# Patient Record
Sex: MALE | Race: BLACK OR AFRICAN AMERICAN | NOT HISPANIC OR LATINO | Employment: FULL TIME | URBAN - NONMETROPOLITAN AREA
[De-identification: names, ages, dates, MRNs, and addresses within clinical notes are randomized per-mention and may not be internally consistent; named-entity substitution may affect disease eponyms.]

---

## 2023-02-15 ENCOUNTER — TELEPHONE (OUTPATIENT)
Dept: FAMILY MEDICINE | Facility: OTHER | Age: 38
End: 2023-02-15

## 2023-02-15 NOTE — TELEPHONE ENCOUNTER
Offered patient appointment with Dr Galdamez 2/16/23 and Tatiana Richmond 2/17/23. Patient declined. Patient stated will wait for appointment with Marisa Hathaway 3/6/23. Provided patient with phone number if decided to schedule

## 2023-02-15 NOTE — TELEPHONE ENCOUNTER
I have no openings today.  Do any [facility] providers have anything open tomorrow?  I am out tomorrow, but perhaps another provider here or [place] has something?    I will establish care with him in March as scheduled    Marisa DANIELS  [phone]

## 2023-02-15 NOTE — TELEPHONE ENCOUNTER
Patient is scheduled to re-establish with Fljuliano on 3/6/23 since he hasn't been seen for 5 years.      Mimi [phone] spouse    Do you want to see him today or at a later date?        ER F/U 12/14/23  Kidney stones    [facility], no chart available at this time    Patient reported he was given  Promethazine 25 mg, Tamulosin    Hydrocodone acetaminophen,  Has not picked it up yet        Patient has urinated 2 times since last night and he was able to retrieve a small red piece, he doesn't know if it is a clot or a stone.

## 2023-02-23 NOTE — PROGRESS NOTES
Assessment & Plan          Encounter to establish care  - Chart updated      Calculus of kidney  - Stone analysis; Future  - Renal panel (Alb, BUN, Ca, Cl, CO2, Creat, Gluc, Phos, K, Na); Future      Immunization due  - TDAP VACCINE (Adacel, Boostrix)  [[id number]]      Family history of diabetes mellitus  - Glucose; Future        Follow-up this Spring please for further lab        Marisa Hathaway, CNP  [facility] - TIFFANY Springer is a 37 year old, presenting for the following health issues:  Establish Care          Concern - Renal calculi - left  He has since passed the stone and brought it today   Onset: 23  Description: Left flank pain  Intensity: severe  Progression of Symptoms:  improving  Accompanying Signs & Symptoms: none at this time  Previous history of similar problem: no  Precipitating factors:        Worsened by: nothing  Alleviating factors:        Improved by: passing stone  Therapies tried and outcome:  none           No further nausea or vomiting  No flank pain  No hematuria          Scott Rivera DO - 2023 11:57 PM CST  Formatting of this note is different from the original.  Patient:   Ti Velázquez    Means of Arrival:   Car    Chief Complaint:  Abdominal Pain    History of Present Illness:  Patient Name: Gian Velázquez   : 1985 Age: 37 year old Sex: male   Date of Service: 2023  MRN: [id number]   Provider: Scott Rivera DO, Emergency Medicine    EMERGENCY ROOM REPORT    SITE: [facility] Emergency Department     Patient seen at approximately 11:50 PM.    CHIEF COMPLAINT: Abdominal pain    HISTORY OF PRESENT ILLNESS: Patient is a 37-year-old male who presents ER with his significant other. Patient claims him and his significant other both ate Mexican food tonight round 630. Approximate hour and a half later he had sudden onset left lower quadrant abdominal pain is constant but waxes and wanes. At times goes straight through to his  back. He feels bloated and has had several bouts of diarrhea tonight he believes 3 total 1 bout of vomiting. Denies any blunt trauma to his abdomen. His significant other is not sick. He has taken no medication for this prior to arrival. Is never had surgery on his abdomen before. Patient denies any chills or fevers. Rates pain as moderate in nature.         Emergency Department Course:  ED Course as of 02/15/23 0104   Wed Feb 15, 2023   0058 Urinalysis, Reflex to Microscopic [RZ]   0104 Urinalysis, Reflex to Microscopic [RZ]     ED Course User Index  [RZ] Scott Rivera, DO     Medications   sodium chloride 0.9% (NS) infusion 1,000 mL (has no administration in time range)   famotidine (PF) (Pepcid) injection 20 mg (has no administration in time range)   ondansetron (Zofran) injection 4 mg (has no administration in time range)   HYDROmorphone PF (Dilaudid) injection 0.5 mg (has no administration in time range)            CT ABDOMEN PELVIS W IV CONTRAST    HISTORY: LLQ abdominal pain; Region of Interest and Additional Information->sudden osnet LLQ pain with vomiting x 3 hours, IV contrast only    FINDINGS:  There is no acute pneumonitis in the imaged lung bases. The heart size is normal. No pericardial or pleural effusion.    The liver demonstrate mild diffuse low attenuation compatible with steatosis. The gallbladder is unremarkable. No biliary dilation. The pancreas, spleen, and adrenal glands are unremarkable. The left kidney demonstrates a mildly delayed nephrogram and slight hydronephrosis. There is a 3 mm stone in the distal left ureter just proximal to the ureterovesicular junction. No additional urinary tract calculi demonstrated.    The bowel is normal in caliber. No obstruction. No wall thickening or adjacent inflammatory edema. No ascites or fluid collection. No pathologically enlarged lymph nodes.    IMPRESSION:  1. 3 mm stone in the distal left ureter resulting in minimal  hydronephrosis.    Electronically Signed: Ajay Burns MD 2/15/2023 12:42 AM          PHQ 2/24/2023   PHQ-9 Total Score 0   Q9: Thoughts of better off dead/self-harm past 2 weeks Not at all         TATI-7 SCORE 2/24/2023   Total Score 0           Patient Active Problem List   Diagnosis     Vitamin D deficiency     Seasonal allergic rhinitis     No past surgical history on file.    Social History     Tobacco Use     Smoking status: Every Day     Smokeless tobacco: Not on file   Substance Use Topics     Alcohol use: Not on file     No family history on file.          No current outpatient medications on file.         Allergies   Allergen Reactions     Seasonal Allergies          No lab results found.         BP Readings from Last 3 Encounters:   02/24/23 112/80   10/05/15 108/80   12/25/14 112/75    Wt Readings from Last 3 Encounters:   02/24/23 91.3 kg (201 lb 4.8 oz)   10/05/15 81.6 kg (180 lb)                Review of Systems   Constitutional, HEENT, cardiovascular, pulmonary, GI, , musculoskeletal, neuro, skin, endocrine and psych systems are negative, except as otherwise noted.            Objective    /80 (BP Location: Left arm, Patient Position: Sitting, Cuff Size: Adult Large)   Pulse 73   Temp 97.8  F (36.6  C) (Tympanic)   Resp 18   Wt 91.3 kg (201 lb 4.8 oz)   SpO2 98%   There is no height or weight on file to calculate BMI.           Physical Exam   GENERAL: healthy, alert and no distress  EYES: Eyes grossly normal to inspection, PERRL and conjunctivae and sclerae normal  NECK: no adenopathy, no asymmetry, masses, or scars and thyroid normal to palpation  RESP: lungs clear to auscultation - no rales, rhonchi or wheezes  CV: regular rate and rhythm, normal S1 S2, no S3 or S4, no murmur, click or rub, no peripheral edema and peripheral pulses strong  ABDOMEN: soft, nontender, no hepatosplenomegaly, no masses and bowel sounds normal  MS: no gross musculoskeletal defects noted, no edema  SKIN:  no suspicious lesions or rashes  PSYCH: mentation appears normal, affect normal/bright

## 2023-02-24 ENCOUNTER — OFFICE VISIT (OUTPATIENT)
Dept: FAMILY MEDICINE | Facility: OTHER | Age: 38
End: 2023-02-24
Attending: NURSE PRACTITIONER
Payer: COMMERCIAL

## 2023-02-24 VITALS
DIASTOLIC BLOOD PRESSURE: 80 MMHG | TEMPERATURE: 97.8 F | HEART RATE: 73 BPM | WEIGHT: 201.3 LBS | RESPIRATION RATE: 18 BRPM | OXYGEN SATURATION: 98 % | SYSTOLIC BLOOD PRESSURE: 112 MMHG

## 2023-02-24 DIAGNOSIS — Z76.89 ENCOUNTER TO ESTABLISH CARE: ICD-10-CM

## 2023-02-24 DIAGNOSIS — Z23 IMMUNIZATION DUE: Primary | ICD-10-CM

## 2023-02-24 DIAGNOSIS — Z83.3 FAMILY HISTORY OF DIABETES MELLITUS: ICD-10-CM

## 2023-02-24 DIAGNOSIS — N20.0 CALCULUS OF KIDNEY: ICD-10-CM

## 2023-02-24 LAB
ALBUMIN SERPL BCG-MCNC: 4.7 G/DL (ref 3.5–5.2)
ANION GAP SERPL CALCULATED.3IONS-SCNC: 13 MMOL/L (ref 7–15)
BUN SERPL-MCNC: 22.5 MG/DL (ref 6–20)
CALCIUM SERPL-MCNC: 10.1 MG/DL (ref 8.6–10)
CHLORIDE SERPL-SCNC: 105 MMOL/L (ref 98–107)
CREAT SERPL-MCNC: 1.2 MG/DL (ref 0.67–1.17)
DEPRECATED HCO3 PLAS-SCNC: 22 MMOL/L (ref 22–29)
FASTING STATUS PATIENT QL REPORTED: YES
GFR SERPL CREATININE-BSD FRML MDRD: 79 ML/MIN/1.73M2
GLUCOSE SERPL-MCNC: 103 MG/DL (ref 70–99)
GLUCOSE SERPL-MCNC: 103 MG/DL (ref 70–99)
HOLD SPECIMEN: NORMAL
PHOSPHATE SERPL-MCNC: 3.2 MG/DL (ref 2.5–4.5)
POTASSIUM SERPL-SCNC: 4.5 MMOL/L (ref 3.4–5.3)
SODIUM SERPL-SCNC: 140 MMOL/L (ref 136–145)

## 2023-02-24 PROCEDURE — 99203 OFFICE O/P NEW LOW 30 MIN: CPT | Mod: 25 | Performed by: NURSE PRACTITIONER

## 2023-02-24 PROCEDURE — 82365 CALCULUS SPECTROSCOPY: CPT | Mod: 90 | Performed by: NURSE PRACTITIONER

## 2023-02-24 PROCEDURE — 90715 TDAP VACCINE 7 YRS/> IM: CPT | Performed by: NURSE PRACTITIONER

## 2023-02-24 PROCEDURE — 90471 IMMUNIZATION ADMIN: CPT | Performed by: NURSE PRACTITIONER

## 2023-02-24 PROCEDURE — 36415 COLL VENOUS BLD VENIPUNCTURE: CPT | Performed by: NURSE PRACTITIONER

## 2023-02-24 PROCEDURE — 80069 RENAL FUNCTION PANEL: CPT | Performed by: NURSE PRACTITIONER

## 2023-02-24 ASSESSMENT — ANXIETY QUESTIONNAIRES
2. NOT BEING ABLE TO STOP OR CONTROL WORRYING: NOT AT ALL
GAD7 TOTAL SCORE: 0
IF YOU CHECKED OFF ANY PROBLEMS ON THIS QUESTIONNAIRE, HOW DIFFICULT HAVE THESE PROBLEMS MADE IT FOR YOU TO DO YOUR WORK, TAKE CARE OF THINGS AT HOME, OR GET ALONG WITH OTHER PEOPLE: NOT DIFFICULT AT ALL
5. BEING SO RESTLESS THAT IT IS HARD TO SIT STILL: NOT AT ALL
6. BECOMING EASILY ANNOYED OR IRRITABLE: NOT AT ALL
GAD7 TOTAL SCORE: 0
7. FEELING AFRAID AS IF SOMETHING AWFUL MIGHT HAPPEN: NOT AT ALL
1. FEELING NERVOUS, ANXIOUS, OR ON EDGE: NOT AT ALL
3. WORRYING TOO MUCH ABOUT DIFFERENT THINGS: NOT AT ALL

## 2023-02-24 ASSESSMENT — PATIENT HEALTH QUESTIONNAIRE - PHQ9
5. POOR APPETITE OR OVEREATING: NOT AT ALL
SUM OF ALL RESPONSES TO PHQ QUESTIONS 1-9: 0

## 2023-02-24 ASSESSMENT — PAIN SCALES - GENERAL: PAINLEVEL: NO PAIN (0)

## 2023-02-24 NOTE — PATIENT INSTRUCTIONS
Assessment & Plan          Encounter to establish care  - Chart updated      Calculus of kidney  - Stone analysis; Future  - Renal panel (Alb, BUN, Ca, Cl, CO2, Creat, Gluc, Phos, K, Na); Future      Immunization due  - TDAP VACCINE (Adacel, Boostrix)  [[id number]]      Family history of diabetes mellitus  - Glucose; Future        Follow-up this Spring please for further lab        Marisa Hathaway, CNP  [facility]

## 2023-02-27 NOTE — RESULT ENCOUNTER NOTE
Stable labs    Stone analysis pending    Marisa AGUIRREHar[facility] State Hospital  [phone]

## 2023-02-28 LAB
APPEARANCE STONE: NORMAL
COMPN STONE: NORMAL
SPECIMEN WT: 5 MG

## 2023-04-30 ENCOUNTER — HEALTH MAINTENANCE LETTER (OUTPATIENT)
Age: 38
End: 2023-04-30

## 2024-07-07 ENCOUNTER — HEALTH MAINTENANCE LETTER (OUTPATIENT)
Age: 39
End: 2024-07-07

## 2025-07-19 ENCOUNTER — HEALTH MAINTENANCE LETTER (OUTPATIENT)
Age: 40
End: 2025-07-19